# Patient Record
Sex: MALE | Race: WHITE
[De-identification: names, ages, dates, MRNs, and addresses within clinical notes are randomized per-mention and may not be internally consistent; named-entity substitution may affect disease eponyms.]

---

## 2019-07-27 ENCOUNTER — HOSPITAL ENCOUNTER (EMERGENCY)
Dept: HOSPITAL 59 - ER | Age: 54
Discharge: LEFT BEFORE DISCHARGE | End: 2019-07-27
Payer: COMMERCIAL

## 2019-07-27 DIAGNOSIS — S61.210A: ICD-10-CM

## 2019-07-27 DIAGNOSIS — Z53.21: Primary | ICD-10-CM

## 2019-07-27 PROCEDURE — 99281 EMR DPT VST MAYX REQ PHY/QHP: CPT

## 2019-07-27 NOTE — EMERGENCY DEPARTMENT RECORD
History of Present Illness





- General


Chief complaint: Extremity Problem


Stated complaint: R INDEX FINGER LAC


Time Seen by Provider: 19 13:13


Mode of Arrival: Ambulatory





- History of Present Illness


Initial comments: 


patient not in the room at 1:45 pm today and  said they left





Onset/Timin


-: Minutes(s)


Location: Right, Hand


Severity scale (1-10): 3


Quality: Aching


Consistency: Constant


Improves with: Nothing





- Related Data


                                Home Medications











 Medication  Instructions  Recorded  Confirmed  Last Taken


 


No Home Med [NO HOME MEDS]  19 Unknown











                                    Allergies











Allergy/AdvReac Type Severity Reaction Status Date / Time


 


oxycodone AdvReac  ITCHING Verified 19 11:44














Travel Screening





- Travel/Exposure Within Last 30 Days


Have you traveled within the last 30 days?: No





- Travel/Exposure Within Last Year


Have you traveled outside the [place] in the last year?: No





- Additonal Travel Details


Have you been exposed to anyone with a communicable illness?: No





- Travel Symptoms


Symptom Screening: None





Past Medical History





- SOCIAL HISTORY


Smoking Status: Never smoker


Alcohol Use: None


Drug Use: None





- RESPIRATORY


Hx Respiratory Disorders: No





- CARDIOVASCULAR


Hx Cardio Disorders: No





- NEURO


Hx Neuro Disorders: No





- GI


Hx GI Disorders: No





- 


Hx Genitourinary Disorders: No





- ENDOCRINE


Hx Endocrine Disorders: No





- MUSCULOSKELETAL


Hx Musculoskeletal Disorders: No





- PSYCH


Hx Psych Problems: No





- HEMATOLOGY/ONCOLOGY


Hx Hematology/Oncology Disorders: No





Family Medical History


Any Significant Family History?: Yes





Course





                                   Vital Signs











  19





  11:35


 


Temperature 98.7 F


 


Pulse Rate 76


 


Respiratory 20





Rate 


 


Blood Pressure 150/73


 


Pulse Ox 98














Disposition


Clinical Impression: 


 Patient left without being seen





Forms:  Patient Portal Access


Time of Disposition: 14:03





Quality





- Quality Measures


Quality Measures: N/A





- Blood Pressure Screening


Does Patient Have Any of the Following: No


Blood Pressure Classification: Hypertensive Reading


Systolic Measurement: 150


Diastolic Measurement: 73


Screening for High Blood Pressure: < Pre-Hypertensive BP, F/U Documented > [

]


Pre-Hypertensive Follow-up Interventions: Referral to alternative/primary care 

provider.